# Patient Record
Sex: MALE | Race: WHITE | Employment: OTHER | URBAN - METROPOLITAN AREA
[De-identification: names, ages, dates, MRNs, and addresses within clinical notes are randomized per-mention and may not be internally consistent; named-entity substitution may affect disease eponyms.]

---

## 2017-06-26 ENCOUNTER — HOSPITAL ENCOUNTER (OUTPATIENT)
Dept: OTHER | Age: 72
Discharge: HOME | End: 2017-06-26
Attending: PHYSICIAN ASSISTANT | Admitting: PHYSICIAN ASSISTANT

## 2017-06-26 LAB
POLARIZED LIGHT EXAM: ABNORMAL
RBC, SYNOVIAL FLUID: 4000 CU MM
SYN LYMPH: 1 %
SYN OTHER CELLS: ABNORMAL %
SYN SEGMENTED: 98 %
SYN WBC COUNT: ABNORMAL CU MM (ref 0–200)

## 2017-06-29 LAB
CULTURE: NORMAL
GRAM SMEAR: NORMAL
REPORT STATUS: NORMAL
REQUEST PROBLEM: NORMAL
SPECIMEN: NORMAL

## 2020-08-11 ENCOUNTER — HOSPITAL ENCOUNTER (OUTPATIENT)
Dept: CT IMAGING | Age: 75
Discharge: HOME | End: 2020-08-11
Payer: MEDICARE

## 2020-08-11 LAB
GFR AFRICAN AMERICAN: >60 ML/MIN/1.73M2
GFR NON-AFRICAN AMERICAN: >60 ML/MIN/1.73M2
POC CREATININE: 1.1 MG/DL (ref 0.9–1.3)

## 2020-08-11 PROCEDURE — 2580000003 HC RX 258: Performed by: PSYCHIATRY & NEUROLOGY

## 2020-08-11 PROCEDURE — 70496 CT ANGIOGRAPHY HEAD: CPT

## 2020-08-11 PROCEDURE — 6360000004 HC RX CONTRAST MEDICATION: Performed by: PSYCHIATRY & NEUROLOGY

## 2020-08-11 PROCEDURE — 70498 CT ANGIOGRAPHY NECK: CPT

## 2020-08-11 RX ORDER — SODIUM CHLORIDE 0.9 % (FLUSH) 0.9 %
10 SYRINGE (ML) INJECTION PRN
Status: DISCONTINUED | OUTPATIENT
Start: 2020-08-11 | End: 2020-08-12 | Stop reason: HOSPADM

## 2020-08-11 RX ADMIN — SODIUM CHLORIDE, PRESERVATIVE FREE 10 ML: 5 INJECTION INTRAVENOUS at 12:30

## 2020-08-11 RX ADMIN — IOPAMIDOL 75 ML: 755 INJECTION, SOLUTION INTRAVENOUS at 12:30

## 2020-09-10 ENCOUNTER — HOSPITAL ENCOUNTER (OUTPATIENT)
Dept: LAB | Age: 75
Discharge: HOME | End: 2020-09-10
Payer: MEDICARE

## 2020-09-10 PROCEDURE — C9803 HOPD COVID-19 SPEC COLLECT: HCPCS

## 2020-09-10 PROCEDURE — U0002 COVID-19 LAB TEST NON-CDC: HCPCS

## 2020-09-11 LAB
SARS-COV-2: NOT DETECTED
SOURCE: NORMAL

## 2020-09-17 ENCOUNTER — HOSPITAL ENCOUNTER (OUTPATIENT)
Dept: NON INVASIVE DIAGNOSTICS | Age: 75
Discharge: HOME | End: 2020-09-17
Payer: MEDICARE

## 2020-09-17 ENCOUNTER — HOSPITAL ENCOUNTER (OUTPATIENT)
Age: 75
Discharge: HOME | End: 2020-09-17
Payer: MEDICARE

## 2020-09-17 VITALS
OXYGEN SATURATION: 95 % | HEART RATE: 64 BPM | RESPIRATION RATE: 13 BRPM | SYSTOLIC BLOOD PRESSURE: 109 MMHG | DIASTOLIC BLOOD PRESSURE: 68 MMHG

## 2020-09-17 LAB
ANION GAP SERPL CALCULATED.3IONS-SCNC: 10 MMOL/L (ref 4–16)
CHLORIDE BLD-SCNC: 104 MMOL/L (ref 99–110)
CO2: 26 MMOL/L (ref 21–32)
POTASSIUM SERPL-SCNC: 3.9 MMOL/L (ref 3.5–5.1)
SODIUM BLD-SCNC: 140 MMOL/L (ref 135–145)

## 2020-09-17 PROCEDURE — 93660 TILT TABLE EVALUATION: CPT

## 2020-09-17 PROCEDURE — 7100000001 HC PACU RECOVERY - ADDTL 15 MIN

## 2020-09-17 PROCEDURE — 80051 ELECTROLYTE PANEL: CPT

## 2020-09-17 PROCEDURE — 7100000000 HC PACU RECOVERY - FIRST 15 MIN

## 2020-09-17 PROCEDURE — 36415 COLL VENOUS BLD VENIPUNCTURE: CPT

## 2020-09-17 NOTE — PROGRESS NOTES
HEAD UP TILT TABLE TEST     Name: Joaquina Zavala  Date: 20  : 1945    MRN: [id number]  Physician: No att. providers found     Allergies: Allergies   Allergen Reactions    Asa [Aspirin]     Influenza Vac Typ A&B Surf Ant     Procaine Hcl      \"all reuben, zylecaine, \"    Pcn [Penicillins] Rash        Medications:   Prior to Visit Medications    Not on File         Past Medical History:   Diagnosis Date    Liver disorder     Multiple allergies        No past surgical history on file.     [x] Patient verbalizes understanding of procedure    [x]  Consent obtained  [x]  NPO X13 hours  [x]  IV established with # 22 Site left WRIST  [x]  12 Lead EKG obtained   [x]  Electrolytes Na [x]  K [x]  CL [x]  CO2 [x]   Time:          3845      Date:  2020    Nursing Notes/ Vital Signs/ LOC/ Skin Color      TIME BP HR RR SPO2 COMMENTS   0902 115/70 72 14 96 SUPINE - PRE PROCEDURE   0909 127/82 65 27 100 STANDING - BEGIN PROCEDURE   0910 121/82 71 34 96 STANDING   0912 123/80 64 15 100 STANDING   0914 131/78 68 15 99 STATES HE IS LIGHT HEADED   0916 134/75 65 15 95 STANDING   0918 121/76 64 17 99 DIZZY - LIGHTHEADED - WORSE THAT WHEN TEST STARTED   0920 120/76 72 18 98 FEELS DIZZY BUT TELLS DOCTOR THAT HE FEELS WORSE WHEN WALKING   0922 126/75 72 21 100 TALKING WITHOUT DISTRESS   0924 120/71 71 22 99 R AND L CAROTID MASSAGE   0925 117/71 71 22 98 TALKING NO INCREASED DIZZINESS   0925 120/80 63 11 98 NTG ADMINISTERED SUBLINGUAL   0926 135/78 69 16 100 STANDING - TALKING WITH PA   0927 126/74 70 17 98 FEELS A LITTLE DIZZY   0928 107/69 90 24 98 HAS HEADACHE\" ABOVE EYES INSIDE MY HEAD\"   0929 109/74 76 11 99 STANDING   0930 96/58 81 14 96 TALKING WITH PA   0931 106/55 86 15 100 STANDING - NOT FEELING NORMAL   0932 78/55 83 23 98 \"HEAD FEELS HEAVY\"   0933 82/59 91 24 96 FEELS LIKE HE WANTS TO VOMIT - WRETCHING   0934 93/62 86 25 98 STANDING   0935 97/69 81 23 96 STANDING - END PROCEDURE   0936 102/63 59

## 2020-09-18 NOTE — PROCEDURES
[address], [facility]                                TILT TABLE TEST    PATIENT NAME: Joaquina Elder                 :        1945  MED REC NO:   [id number]                          ROOM:  ACCOUNT NO:   [de-identified]                           ADMIT DATE: 2020  PROVIDER:     ELYSE Flores    DATE OF STUDY:  2020    REASON FOR PROCEDURE:  Dizziness, lightheadedness and history of  syncope. DESCRIPTION OF PROCEDURE:  The patient presents to our noninvasive lab  today for tilt table study in order to further evaluate chronic  dizziness that has been going on since 2020 with few episodes of  syncope as well. Workup to this point has been negative neurologically  and cardiovascularly. Informed consent was obtained. The patient was  placed onto the tilt table and elevated to a 75-degree position. Resting blood pressure at that time was 115/70, heart rate was 72. Blood pressure remained stable throughout the procedure peaking at  134/75, heart rate was 65 at that time. He did say a few times that he  was feeling dizzy and lightheaded, which was his baseline. After 15  minutes, proceeded with carotid massage, right and left were done,  strips were printed. He remained in normal sinus rhythm and blood  pressure remained stable at that time at 117/71. His dizziness did not  worsen with this. His blood pressure remained stable at 120/80 and  heart rate was still normal sinus rhythm at 63 and nitro spray was  given. He did start to feel a little bit dizzy and lightheaded and his  blood pressure did drop incrementally with the low at 78/55. Heart  rate, however, did increase all the way up to 91, remained in normal  sinus rhythm with a few episodes of PVCs. At this time, he did start to  feel like he was going to vomit and his lightheadedness got worse.   He  did dry heave a few times, but did not vomit and did not pass out, and  blood pressure began to increase and come back to normal.  Heart rate  remained elevated. At this time, after 10 about minutes, he was laid  back down into a supine position. Heart rate returned to normal at 59,  remained sinus and blood pressure returned to 102/63. IMPRESSION:  This is a negative stress test for neurocardiogenic syncope  and autonomic dysfunction. I did discuss with the patient as workup  thus far has been negative, he did have a 72-hour Zio patch placed at  the office that did show short episodes of PAF, but nonsignificant. We  could place an event monitor of 30 days. Although as his symptoms are  occurring daily, unsure if this would be able to add much to the  diagnosis. I do not believe that his symptoms are cardiac related. More likely to be vertigo or neurologic in some capacity. The patient  is following up with neurologist at this time. We will see the patient  in followup and go over recommendations.       Patient seen  And procedure performed  Agree with above   ELYSE Alejandra    D: 09/17/2020 10:07:52       T: 09/17/2020 10:55:31     [email or web address]  Job#: [id number]     Doc#: [id number]    CC:

## 2020-10-15 LAB
AVERAGE GLUCOSE: NORMAL
FOLATE: >20
HBA1C MFR BLD: 5.6 %
TSH SERPL DL<=0.05 MIU/L-ACNC: 4.51 UIU/ML
VITAMIN B-12: 487

## 2021-04-27 ENCOUNTER — HOSPITAL ENCOUNTER (EMERGENCY)
Age: 76
Discharge: HOME | End: 2021-04-27
Attending: EMERGENCY MEDICINE
Payer: MEDICARE

## 2021-04-27 ENCOUNTER — APPOINTMENT (OUTPATIENT)
Dept: GENERAL RADIOLOGY | Age: 76
End: 2021-04-27
Payer: MEDICARE

## 2021-04-27 ENCOUNTER — APPOINTMENT (OUTPATIENT)
Dept: CT IMAGING | Age: 76
End: 2021-04-27
Payer: MEDICARE

## 2021-04-27 VITALS
HEART RATE: 74 BPM | DIASTOLIC BLOOD PRESSURE: 73 MMHG | RESPIRATION RATE: 18 BRPM | SYSTOLIC BLOOD PRESSURE: 119 MMHG | TEMPERATURE: 98 F | OXYGEN SATURATION: 100 %

## 2021-04-27 DIAGNOSIS — R55 SYNCOPE, UNSPECIFIED SYNCOPE TYPE: Primary | ICD-10-CM

## 2021-04-27 LAB
ALBUMIN SERPL-MCNC: 3.8 GM/DL (ref 3.4–5)
ALP BLD-CCNC: 126 IU/L (ref 40–129)
ALT SERPL-CCNC: 14 U/L (ref 10–40)
ANION GAP SERPL CALCULATED.3IONS-SCNC: 7 MMOL/L (ref 4–16)
AST SERPL-CCNC: 25 IU/L (ref 15–37)
BACTERIA: NEGATIVE /HPF
BASOPHILS ABSOLUTE: 0 K/CU MM
BASOPHILS RELATIVE PERCENT: 0.5 % (ref 0–1)
BILIRUB SERPL-MCNC: 0.7 MG/DL (ref 0–1)
BILIRUBIN DIRECT: 0.2 MG/DL (ref 0–0.3)
BILIRUBIN URINE: NEGATIVE MG/DL
BILIRUBIN, INDIRECT: 0.5 MG/DL (ref 0–0.7)
BLOOD, URINE: NEGATIVE
BUN BLDV-MCNC: 16 MG/DL (ref 6–23)
CALCIUM SERPL-MCNC: 8.9 MG/DL (ref 8.3–10.6)
CHLORIDE BLD-SCNC: 99 MMOL/L (ref 99–110)
CLARITY: CLEAR
CO2: 30 MMOL/L (ref 21–32)
COLOR: YELLOW
CREAT SERPL-MCNC: 1.2 MG/DL (ref 0.9–1.3)
DIFFERENTIAL TYPE: ABNORMAL
EOSINOPHILS ABSOLUTE: 0.3 K/CU MM
EOSINOPHILS RELATIVE PERCENT: 3.4 % (ref 0–3)
GFR AFRICAN AMERICAN: >60 ML/MIN/1.73M2
GFR NON-AFRICAN AMERICAN: 59 ML/MIN/1.73M2
GLUCOSE BLD-MCNC: 86 MG/DL
GLUCOSE BLD-MCNC: 86 MG/DL (ref 70–99)
GLUCOSE BLD-MCNC: 91 MG/DL (ref 70–99)
GLUCOSE, URINE: NEGATIVE MG/DL
HCT VFR BLD CALC: 37.5 % (ref 42–52)
HEMOGLOBIN: 10.9 GM/DL (ref 13.5–18)
IMMATURE NEUTROPHIL %: 0.3 % (ref 0–0.43)
KETONES, URINE: NEGATIVE MG/DL
LEUKOCYTE ESTERASE, URINE: NEGATIVE
LYMPHOCYTES ABSOLUTE: 1.5 K/CU MM
LYMPHOCYTES RELATIVE PERCENT: 19.2 % (ref 24–44)
MCH RBC QN AUTO: 24.1 PG (ref 27–31)
MCHC RBC AUTO-ENTMCNC: 29.1 % (ref 32–36)
MCV RBC AUTO: 82.8 FL (ref 78–100)
MONOCYTES ABSOLUTE: 0.7 K/CU MM
MONOCYTES RELATIVE PERCENT: 9 % (ref 0–4)
NITRITE URINE, QUANTITATIVE: NEGATIVE
NUCLEATED RBC %: 0 %
PDW BLD-RTO: 17.2 % (ref 11.7–14.9)
PH, URINE: 8 (ref 5–8)
PLATELET # BLD: 198 K/CU MM (ref 140–440)
PMV BLD AUTO: 10.6 FL (ref 7.5–11.1)
POTASSIUM SERPL-SCNC: 4.2 MMOL/L (ref 3.5–5.1)
PRO-BNP: 108.9 PG/ML
PROTEIN UA: NEGATIVE MG/DL
RBC # BLD: 4.53 M/CU MM (ref 4.6–6.2)
RBC URINE: 1 /HPF (ref 0–3)
SEGMENTED NEUTROPHILS ABSOLUTE COUNT: 5.3 K/CU MM
SEGMENTED NEUTROPHILS RELATIVE PERCENT: 67.6 % (ref 36–66)
SODIUM BLD-SCNC: 136 MMOL/L (ref 135–145)
SPECIFIC GRAVITY UA: 1.01 (ref 1–1.03)
TOTAL IMMATURE NEUTOROPHIL: 0.02 K/CU MM
TOTAL NUCLEATED RBC: 0 K/CU MM
TOTAL PROTEIN: 6.9 GM/DL (ref 6.4–8.2)
TRICHOMONAS: NORMAL /HPF
TROPONIN T: <0.01 NG/ML
UROBILINOGEN, URINE: NEGATIVE MG/DL (ref 0.2–1)
WBC # BLD: 7.9 K/CU MM (ref 4–10.5)
WBC UA: <1 /HPF (ref 0–2)

## 2021-04-27 PROCEDURE — 82962 GLUCOSE BLOOD TEST: CPT

## 2021-04-27 PROCEDURE — 85025 COMPLETE CBC W/AUTO DIFF WBC: CPT

## 2021-04-27 PROCEDURE — 84484 ASSAY OF TROPONIN QUANT: CPT

## 2021-04-27 PROCEDURE — 71045 X-RAY EXAM CHEST 1 VIEW: CPT

## 2021-04-27 PROCEDURE — 99285 EMERGENCY DEPT VISIT HI MDM: CPT

## 2021-04-27 PROCEDURE — 93005 ELECTROCARDIOGRAM TRACING: CPT | Performed by: EMERGENCY MEDICINE

## 2021-04-27 PROCEDURE — 70450 CT HEAD/BRAIN W/O DYE: CPT

## 2021-04-27 PROCEDURE — 80053 COMPREHEN METABOLIC PANEL: CPT

## 2021-04-27 PROCEDURE — 81001 URINALYSIS AUTO W/SCOPE: CPT

## 2021-04-27 PROCEDURE — 82248 BILIRUBIN DIRECT: CPT

## 2021-04-27 PROCEDURE — 83880 ASSAY OF NATRIURETIC PEPTIDE: CPT

## 2021-04-27 NOTE — ED TRIAGE NOTES
Pt arrived to ED via EMS. Pt was at [facility] and had five 10-15 sec syncope episodes. Pt had 2 for EMS and has 2 since being in the ED.

## 2021-04-27 NOTE — ED PROVIDER NOTES
[address] ENCOUNTER      Pt Name: Jamel Hoyos  MRN: [id number]  [place] [birth year]  Date of evaluation: 4/27/2021  Provider: Bessie Lefort, MD    CHIEF COMPLAINT       Chief Complaint   Patient presents with    Loss of Consciousness     x5 for provider, x2 for EMS         HISTORY OF PRESENT ILLNESS      Jamel Hoyos is a 67 y.o. male who presents to the emergency department  for   Chief Complaint   Patient presents with    Loss of Consciousness     x5 for provider, x2 for EMS       67 yom with h/o afib and syncope presents after multiple brief syncopal episodes. He was at a senior center earlier today and noted to have had 5, 10-15 second episodes of syncope. He returned to his neurolgoic baseline after each episode. No report of any focal neurologic deficits. No report of focal weakness, paresthesias, speech, vision or hearing changes. He was brought to the ED for evaluation. While in the ED, he has had several similar episodes where he appears to lose consciousness for seconds then returns to his baseline. He endorses feeling some lightheadedness. He denies any chest pain or other focal pain. Denies any changes in  From review of records, he has a history of syncope and has had some extensive cardiac and neruologic work-up including a tilt test in 2/2021 that was unremarkable. In the ED, he is alert and oriented. He is answering questions well. He is moving all extremities spontaneously. Nursing Notes, Triage Notes & Vital Signs were reviewed. REVIEW OF SYSTEMS    (2-9 systems for level 4, 10 or more for level 5)     Review of Systems   Constitutional: Negative for chills and fever. HENT: Negative for congestion, rhinorrhea and sore throat. Eyes: Negative for pain and discharge. Respiratory: Negative for cough and shortness of breath.     Cardiovascular: Negative for chest pain and palpitations. Gastrointestinal: Negative for abdominal pain, nausea and vomiting. Endocrine: Negative for polydipsia and polyuria. Genitourinary: Negative for dysuria and flank pain. Musculoskeletal: Negative for back pain and neck pain. Skin: Negative for pallor and wound. Neurological: Positive for syncope. Negative for dizziness, facial asymmetry, light-headedness and headaches. Psychiatric/Behavioral: Negative for confusion. Except as noted above the remainder of the review of systems was reviewed and negative. PAST MEDICAL HISTORY     Past Medical History:   Diagnosis Date    Liver disorder     Multiple allergies        Prior to Admission medications    Not on File        There is no problem list on file for this patient. SURGICAL HISTORY     History reviewed. No pertinent surgical history. CURRENT MEDICATIONS       There are no discharge medications for this patient. ALLERGIES     Asa [aspirin], Influenza vac typ a&b surf ant, Procaine hcl, and Pcn [penicillins]    FAMILY HISTORY     History reviewed. No pertinent family history.        SOCIAL HISTORY       Social History     Socioeconomic History    Marital status:      Spouse name: None    Number of children: None    Years of education: None    Highest education level: None   Occupational History    None   Social Needs    Financial resource strain: None    Food insecurity     Worry: None     Inability: None    Transportation needs     Medical: None     Non-medical: None   Tobacco Use    Smoking status: Never Smoker    Smokeless tobacco: Never Used   Substance and Sexual Activity    Alcohol use: No    Drug use: No    Sexual activity: None   Lifestyle    Physical activity     Days per week: None     Minutes per session: None    Stress: None   Relationships    Social connections     Talks on phone: None     Gets together: None     Attends Shinto service: None     Active member of club or organization: None     Attends meetings of clubs or organizations: None     Relationship status: None    Intimate partner violence     Fear of current or ex partner: None     Emotionally abused: None     Physically abused: None     Forced sexual activity: None   Other Topics Concern    None   Social History Narrative    None       SCREENINGS    Araceli Coma Scale  Eye Opening: Spontaneous  Best Verbal Response: Oriented  Best Motor Response: Obeys commands  Araceli Coma Scale Score: 15          PHYSICAL EXAM    (up to 7 for level 4, 8 or more for level 5)     ED Triage Vitals   BP Temp Temp src Pulse Resp SpO2 Height Weight   -- -- -- -- -- -- -- --       Physical Exam  Vitals signs reviewed. Constitutional:       Appearance: He is not ill-appearing or toxic-appearing. HENT:      Head: Normocephalic and atraumatic. Nose: Nose normal. No congestion or rhinorrhea. Mouth/Throat:      Mouth: Mucous membranes are moist.      Pharynx: No oropharyngeal exudate or posterior oropharyngeal erythema. Eyes:      General:         Right eye: No discharge. Left eye: No discharge. Extraocular Movements: Extraocular movements intact. Pupils: Pupils are equal, round, and reactive to light. Neck:      Musculoskeletal: Normal range of motion and neck supple. Cardiovascular:      Rate and Rhythm: Normal rate. Heart sounds: No friction rub. No gallop. Pulmonary:      Effort: Pulmonary effort is normal. No respiratory distress. Chest:      Chest wall: No tenderness. Abdominal:      Palpations: Abdomen is soft. Tenderness: There is no abdominal tenderness. There is no guarding. Musculoskeletal: Normal range of motion. General: No swelling or tenderness. Skin:     General: Skin is warm. Capillary Refill: Capillary refill takes less than 2 seconds. Findings: No erythema or rash. Neurological:      General: No focal deficit present.       Mental Status: He is alert and oriented to person, place, and time. Mental status is at baseline. DIAGNOSTIC RESULTS     Labs Reviewed   BASIC METABOLIC PANEL W/ REFLEX TO MG FOR LOW K - Abnormal; Notable for the following components:       Result Value    GFR Non- 59 (*)     All other components within normal limits   CBC WITH AUTO DIFFERENTIAL - Abnormal; Notable for the following components:    RBC 4.53 (*)     Hemoglobin 10.9 (*)     Hematocrit 37.5 (*)     MCH 24.1 (*)     MCHC 29.1 (*)     RDW 17.2 (*)     Segs Relative 67.6 (*)     Lymphocytes % 19.2 (*)     Monocytes % 9.0 (*)     Eosinophils % 3.4 (*)     All other components within normal limits   POCT GLUCOSE - Normal   HEPATIC FUNCTION PANEL   TROPONIN   BRAIN NATRIURETIC PEPTIDE   URINE RT REFLEX TO CULTURE   POCT GLUCOSE          EKG: All EKG's are interpreted by the Emergency Department Physician who either signs or Co-signs this chart in the absence of a cardiologist.       EKG Interpretation    Interpreted by emergency department physician    EKG is interpreted by me. EKG shows sinus rhythm at 81 bpm, there are some PVCs, there is some artifact on EKG making a bit difficult to interpret, Axis appears to be nondeviated, no remarkable ST segment patients or depressions, there are some inverted T waves in lateral leads. There does appear to be a V prime wave in V1 consistent with an incomplete right bundle branch block, MN interval 164, QRS ration 106, QTC of 185. Fine impression, nonspecific EKG. From the records, he has had similar morphology on previous EKGs. Nathaniel Code     RADIOLOGY:     Non-plain film images such as CT, Ultrasound and MRI are read by the radiologist. Plain radiographic images are visualized and preliminarily interpreted by the emergency physician.        Interpretation per the Radiologist below, if available at the time of this note:    XR CHEST PORTABLE   Final Result   Retrocardiac opacity may reflect neurological baseline. Denies any other remarkable symptoms. Presents with over unremarkable vitals. Did obtain labs and chest x-ray. EKG is nonischemic. Labs nonacute at baseline. Head CT scan nonacute. Chest x-ray nonacute. Results are discussed with patient. He is offered admission given that he does have a cluster of these episodes. This time he prefers continued outpatient management. I did discuss disposition with him and his wife. They demonstrate understanding shows appropriate capacity to decline admission. He states he believes his symptoms may be due to \"stress. \"  He reports he is volunteering and that is the source of his stress. Given unremarkable work-up, I do believe he is safe for discharge home. He has been observed for several days in the emergency department without further such episodes. He will follow outpatient with his established physicians. Strict return precautions for worsening concerning symptoms are discussed. He discharged home in stable condition. Amount and/or Complexity of Data Reviewed  Decide to obtain previous medical records or to obtain history from someone other than the patient: yes        -  Patient seen and evaluated in the emergency department. -  Triage and nursing notes reviewed and incorporated. -  Old chart records reviewed and incorporated. -  Work-up included:  See above  -  Results discussed with patient. CONSULTS:  None    PROCEDURES:  None performed unless otherwise noted below     Procedures        FINAL IMPRESSION      1.  Syncope, unspecified syncope type          DISPOSITION/PLAN   DISPOSITION Decision To Discharge 04/27/2021 04:26:23 PM      PATIENT REFERRED TO:  Thaddeus Bryant MD  [address] [place] [address],[address]  [phone]    Schedule an appointment as soon as possible for a visit in 1 day      Your Cardiologist and Your Neurologist  Please follow-up promtply with your neurologist and your cardiologist          DISCHARGE MEDICATIONS:  There are no discharge medications for this patient. ED Provider Disposition Time  DISPOSITION Decision To Discharge 04/27/2021 04:26:23 PM      Appropriate personal protective equipment was worn during the patient's evaluation. These included surgical, eye protection, surgical mask or in 95 respirator and gloves. The patient was also placed in a surgical mask for the prevention of possible spread of respiratory viral illnesses. The Patient was instructed to read the package inserts with any medication that was prescribed. Major potential reactions and medication interactions were discussed. The Patient understands that there are numerous possible adverse reactions not covered. The patient was also instructed to arrange follow-up with his or her primary care provider for review of any pending labwork or incidental findings on any radiology results that were obtained. All efforts were made to discuss any incidental findings that require further monitoring. Controlled Substances Monitoring:     No flowsheet data found.     (Please note that portions of this note were completed with a voice recognition program.  Efforts were made to edit the dictations but occasionally words are mis-transcribed.)    Tate Lorenz MD (electronically signed)  Attending Emergency Physician          Tate Lorenz MD  05/02/21 [phone]

## 2021-04-28 LAB
EKG ATRIAL RATE: 81 BPM
EKG DIAGNOSIS: NORMAL
EKG P AXIS: 48 DEGREES
EKG P-R INTERVAL: 164 MS
EKG Q-T INTERVAL: 418 MS
EKG QRS DURATION: 106 MS
EKG QTC CALCULATION (BAZETT): 485 MS
EKG R AXIS: 6 DEGREES
EKG T AXIS: 54 DEGREES
EKG VENTRICULAR RATE: 81 BPM

## 2021-04-28 PROCEDURE — 93010 ELECTROCARDIOGRAM REPORT: CPT | Performed by: INTERNAL MEDICINE

## 2021-05-02 ASSESSMENT — ENCOUNTER SYMPTOMS
SHORTNESS OF BREATH: 0
NAUSEA: 0
SORE THROAT: 0
BACK PAIN: 0
EYE DISCHARGE: 0
EYE PAIN: 0
VOMITING: 0
COUGH: 0
RHINORRHEA: 0
ABDOMINAL PAIN: 0

## 2021-09-13 DIAGNOSIS — R20.8 DYSESTHESIA: ICD-10-CM

## 2021-09-13 DIAGNOSIS — G60.9 IDIOPATHIC PERIPHERAL NEUROPATHY: ICD-10-CM

## 2021-09-13 DIAGNOSIS — I48.91 ATRIAL FIBRILLATION, UNSPECIFIED TYPE (HCC): ICD-10-CM

## 2021-09-13 PROBLEM — R42 LIGHTHEADEDNESS: Status: ACTIVE | Noted: 2020-02-17

## 2021-09-13 PROBLEM — R27.0 ATAXIA: Status: ACTIVE | Noted: 2020-02-17

## 2021-09-13 RX ORDER — LANSOPRAZOLE 30 MG/1
CAPSULE, DELAYED RELEASE ORAL
COMMUNITY

## 2021-09-13 RX ORDER — LEVOTHYROXINE SODIUM 0.1 MG/1
TABLET ORAL
COMMUNITY

## 2021-09-13 RX ORDER — CARBOXYMETHYLCELLULOSE SODIUM 5 MG/ML
SOLUTION/ DROPS OPHTHALMIC
COMMUNITY

## 2021-09-13 RX ORDER — MECLIZINE HYDROCHLORIDE 25 MG/1
TABLET ORAL
COMMUNITY
End: 2022-02-15

## 2021-09-13 RX ORDER — MONTELUKAST SODIUM 10 MG/1
TABLET ORAL
COMMUNITY

## 2021-09-13 RX ORDER — SUCRALFATE 1 G/1
TABLET ORAL
COMMUNITY

## 2021-09-13 RX ORDER — SPIRONOLACTONE 25 MG/1
TABLET ORAL
COMMUNITY

## 2021-09-13 RX ORDER — PROMETHAZINE HYDROCHLORIDE 6.25 MG/5ML
SYRUP ORAL
COMMUNITY
End: 2022-02-15

## 2021-09-13 RX ORDER — CELECOXIB 200 MG/1
CAPSULE ORAL
COMMUNITY

## 2021-09-13 RX ORDER — TRAMADOL HYDROCHLORIDE 50 MG/1
TABLET ORAL
COMMUNITY
End: 2022-02-15

## 2021-09-13 RX ORDER — PREDNISONE 20 MG/1
TABLET ORAL
COMMUNITY
End: 2022-02-15

## 2021-09-13 RX ORDER — LORAZEPAM 1 MG/1
TABLET ORAL
COMMUNITY

## 2021-09-13 RX ORDER — AMMONIUM LACTATE 12 G/100G
LOTION TOPICAL
COMMUNITY
End: 2022-02-15 | Stop reason: ALTCHOICE

## 2021-09-13 RX ORDER — CICLOPIROX 7.7 MG/G
GEL TOPICAL
COMMUNITY
End: 2022-02-15 | Stop reason: ALTCHOICE

## 2021-09-13 RX ORDER — ALLOPURINOL 100 MG/1
TABLET ORAL
COMMUNITY

## 2021-09-13 RX ORDER — SILDENAFIL 100 MG/1
TABLET, FILM COATED ORAL
COMMUNITY
End: 2022-02-15

## 2021-09-13 RX ORDER — RIFAMPIN 150 MG/1
CAPSULE ORAL
COMMUNITY

## 2021-09-13 RX ORDER — BUMETANIDE 1 MG/1
TABLET ORAL
COMMUNITY

## 2021-09-13 RX ORDER — PEG-3350, SODIUM SULFATE, SODIUM CHLORIDE, POTASSIUM CHLORIDE, SODIUM ASCORBATE AND ASCORBIC ACID 7.5-2.691G
KIT ORAL
COMMUNITY
End: 2022-02-15

## 2021-09-13 RX ORDER — ALBUTEROL SULFATE 90 UG/1
AEROSOL, METERED RESPIRATORY (INHALATION)
COMMUNITY

## 2021-09-13 RX ORDER — MOMETASONE FUROATE 50 UG/1
SPRAY, METERED NASAL
COMMUNITY

## 2021-09-27 ENCOUNTER — OFFICE VISIT (OUTPATIENT)
Dept: NEUROLOGY | Age: 76
End: 2021-09-27
Payer: MEDICARE

## 2021-09-27 VITALS
BODY MASS INDEX: 20.99 KG/M2 | WEIGHT: 155 LBS | HEIGHT: 72 IN | OXYGEN SATURATION: 98 % | HEART RATE: 62 BPM | SYSTOLIC BLOOD PRESSURE: 118 MMHG | DIASTOLIC BLOOD PRESSURE: 78 MMHG

## 2021-09-27 DIAGNOSIS — R42 VERTIGO: Primary | ICD-10-CM

## 2021-09-27 DIAGNOSIS — G60.9 IDIOPATHIC PERIPHERAL NEUROPATHY: ICD-10-CM

## 2021-09-27 DIAGNOSIS — R42 LIGHTHEADEDNESS: ICD-10-CM

## 2021-09-27 PROCEDURE — 99214 OFFICE O/P EST MOD 30 MIN: CPT | Performed by: PSYCHIATRY & NEUROLOGY

## 2021-09-27 NOTE — PROGRESS NOTES
9/27/21    Nedra Richard  1945    Chief Complaint   Patient presents with    Follow-up     Vertigo no improvement since last visit even with Epley Maneuver       History of Present Illness  Pato Gallo is a 67 y.o. male presenting today for follow-up of:  Shun is seen in follow-up for dizziness. At a previous visit he had a strong sensation of lightheadedness. He did have fainting during autonomic testing. The testing was relatively inconclusive, though. We did trial midodrine 5 mg 3 times daily empirically and he states this may have \"berserk like\" and that was too strong and made him feel worse. He has since stopped the medication. He states that he does take Ativan 2 mg twice daily, both doses towards the end of the day, which helps his sleep. It helps him calm down enough to fall asleep but it does not necessarily help the dizziness. He has tried doses during the waking hours and this has not helped the dizziness. He was told by a friend to try the Epley maneuver and he states that he has been doing this and feels that it has been somewhat helpful. He denies ever trying vestibular therapy. Current Outpatient Medications   Medication Sig Dispense Refill    allopurinol (ZYLOPRIM) 100 MG tablet allopurinol 100 mg tablet   take one tablet twice a day      ammonium lactate (LAC-HYDRIN) 12 % lotion ammonium lactate 12 % lotion      apixaban (ELIQUIS) 5 MG TABS tablet Eliquis 5 mg tablet      bumetanide (BUMEX) 1 MG tablet bumetanide 1 mg tablet   Take 1 tablet every day by oral route.  celecoxib (CELEBREX) 200 MG capsule Celebrex 200 mg capsule   1 tab daily      lansoprazole (PREVACID) 30 MG delayed release capsule       levothyroxine (SYNTHROID) 100 MCG tablet Synthroid 100 mcg tablet   TAKE (1) TABLET BY MOUTH DAILY      LORazepam (ATIVAN) 1 MG tablet Ativan 1 mg tablet   Take 1 tablet 4 times a day by oral route as needed.       montelukast (SINGULAIR) 10 MG tablet Singulair 10 mg tablet   Tablet     1 daily      rifAMPin (RIFADIN) 150 MG capsule rifampin 150 mg capsule   Capsule     1 daily      rifaximin (XIFAXAN) 200 MG tablet       spironolactone (ALDACTONE) 25 MG tablet 2 daily      sucralfate (CARAFATE) 1 GM tablet Carafate 1 gram tablet   Take 1 tablet 4 times a day by oral route as needed.  traMADol (ULTRAM) 50 MG tablet tramadol 50 mg tablet      albuterol sulfate HFA (PROAIR HFA) 108 (90 Base) MCG/ACT inhaler ProAir HFA 90 mcg/actuation aerosol inhaler      carboxymethylcellulose (REFRESH PLUS) 0.5 % SOLN ophthalmic solution Lubricating Plus 0.5 % eye drops in a dropperette (Patient not taking: Reported on 9/27/2021)      Ciclopirox (LOPROX) 0.77 % gel ciclopirox 0.77 % topical gel (Patient not taking: Reported on 9/27/2021)      hydrocortisone 2.5 % cream hydrocortisone 2.5 % topical cream (Patient not taking: Reported on 9/27/2021)      meclizine (ANTIVERT) 25 MG tablet meclizine 25 mg tablet (Patient not taking: Reported on 9/27/2021)      metoprolol tartrate (LOPRESSOR) 25 MG tablet metoprolol tartrate 25 mg tablet   Take 1 tablet twice a day by oral route.  mometasone (NASONEX) 50 MCG/ACT nasal spray mometasone 50 mcg/actuation nasal spray      PEG-KCl-NaCl-NaSulf-Na Asc-C (MOVIPREP) 100 g solution MoviPrep 100 gram-7.5 gram-2.691 gram oral powder packet (Patient not taking: Reported on 9/27/2021)      predniSONE (DELTASONE) 20 MG tablet prednisone 20 mg tablet (Patient not taking: Reported on 9/27/2021)      promethazine (PHENERGAN) 6.25 MG/5ML syrup  (Patient not taking: Reported on 9/27/2021)      sildenafil (VIAGRA) 100 MG tablet Viagra 100 mg tablet (Patient not taking: Reported on 9/27/2021)       No current facility-administered medications for this visit.        Physical Exam:    Mental Status   Orientation: oriented to person, oriented to place, oriented to problem and oriented to time    Mood/affectappropriate mood and appropriate affect   Memory/Other: recent memory intact, remote memory intact, fund of knowledge intact, attention span normal and concentration normal  Language  Language: (normal) language, no dysarthria, (normal) articulation and no dysphasia/aphasia  Cranial Nerves   Eyes: pupils normal size and reactive to light and visual fields appear full   CN III, IV, VI : extraocular muscle strength normal, normal pursuit, no nystagmus and no ptosis, Dizziness with gaze to the left   Facial Motor: normal facial motor  Motor/Coordination Exam   Power: motor strength appears intact throughout, no arm drift and normal tone    Gait and Stance   Gait/Posture: station normal, casual gait normal and ambulates independently         /78 (Site: Right Upper Arm, Position: Sitting, Cuff Size: Medium Adult)   Pulse 62   Ht 6' (1.829 m)   Wt 155 lb (70.3 kg)   SpO2 98%   BMI 21.02 kg/m²     Assessment and Plan     Diagnosis Orders   1. Vertigo  PT vestibular rehab   2. Idiopathic peripheral neuropathy     3. Lightheadedness       Shun has been having treatment refractory dizziness. The characteristics of his dizziness appear to evolve with time. Previous description of dizziness was profoundly that of lightheadedness. Empiric treatment with midodrine was not successful. He has been using Epley maneuver at home with some success. I will set him up for vestibular therapy as this could prove useful. He does not want to trial any other medication at this time. Return in about 4 months (around 1/27/2022) for me or CAIN.     Romayne Holiday, DO

## 2022-02-15 ENCOUNTER — OFFICE VISIT (OUTPATIENT)
Dept: NEUROLOGY | Age: 77
End: 2022-02-15
Payer: MEDICARE

## 2022-02-15 VITALS
DIASTOLIC BLOOD PRESSURE: 80 MMHG | HEART RATE: 51 BPM | OXYGEN SATURATION: 98 % | BODY MASS INDEX: 20.99 KG/M2 | WEIGHT: 155 LBS | SYSTOLIC BLOOD PRESSURE: 120 MMHG | HEIGHT: 72 IN

## 2022-02-15 DIAGNOSIS — R55 SYNCOPE, UNSPECIFIED SYNCOPE TYPE: ICD-10-CM

## 2022-02-15 DIAGNOSIS — R42 VERTIGO: Primary | ICD-10-CM

## 2022-02-15 DIAGNOSIS — F41.9 ANXIETY: ICD-10-CM

## 2022-02-15 PROCEDURE — 1036F TOBACCO NON-USER: CPT | Performed by: PSYCHIATRY & NEUROLOGY

## 2022-02-15 PROCEDURE — 99214 OFFICE O/P EST MOD 30 MIN: CPT | Performed by: PSYCHIATRY & NEUROLOGY

## 2022-02-15 PROCEDURE — 4040F PNEUMOC VAC/ADMIN/RCVD: CPT | Performed by: PSYCHIATRY & NEUROLOGY

## 2022-02-15 PROCEDURE — G8484 FLU IMMUNIZE NO ADMIN: HCPCS | Performed by: PSYCHIATRY & NEUROLOGY

## 2022-02-15 PROCEDURE — G8427 DOCREV CUR MEDS BY ELIG CLIN: HCPCS | Performed by: PSYCHIATRY & NEUROLOGY

## 2022-02-15 PROCEDURE — 1123F ACP DISCUSS/DSCN MKR DOCD: CPT | Performed by: PSYCHIATRY & NEUROLOGY

## 2022-02-15 PROCEDURE — G8420 CALC BMI NORM PARAMETERS: HCPCS | Performed by: PSYCHIATRY & NEUROLOGY

## 2022-02-27 NOTE — PROGRESS NOTES
2/15/22    Bola Jake  [id number]    Chief Complaint   Patient presents with    Follow-up     Vertigo has been the same since last visit. History of Present Illness  Henna Booker is a 67 y.o. male presenting today for follow-up of:  Shun is seen in follow-up for dizziness. 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 He had several seizure-like spells at his doctor's office. He states that he does not remember driving to his doctor's office but in the waiting room he fainted and had some shaking associated with it. He states that he has tried vestibular therapy which was not overly helpful for the dizziness. Previously, it was thought that he may be had not tried vestibular therapy but after I described to him he states that he has tried this. He does do the Epley maneuver at home which is sometimes helpful. Other than Ativan which she takes for sleep he has not been on any anxiety medicines such as an SNRI or SSRI. Current Outpatient Medications   Medication Sig Dispense Refill    sertraline (ZOLOFT) 50 MG tablet Take 1 tablet by mouth daily 30 tablet 5    albuterol sulfate HFA (PROAIR HFA) 108 (90 Base) MCG/ACT inhaler ProAir HFA 90 mcg/actuation aerosol inhaler      allopurinol (ZYLOPRIM) 100 MG tablet allopurinol 100 mg tablet   take one tablet twice a day      bumetanide (BUMEX) 1 MG tablet bumetanide 1 mg tablet   Take 1 tablet every day by oral route.  carboxymethylcellulose (REFRESH PLUS) 0.5 % SOLN ophthalmic solution Lubricating Plus 0.5 % eye drops in a dropperette      celecoxib (CELEBREX) 200 MG capsule Celebrex 200 mg capsule   1 tab daily      lansoprazole (PREVACID) 30 MG delayed release capsule       levothyroxine (SYNTHROID) 100 MCG tablet Synthroid 100 mcg tablet   TAKE (1) TABLET BY MOUTH DAILY      LORazepam (ATIVAN) 1 MG tablet Ativan 1 mg tablet   Take 1 tablet 4 times a day by oral route as needed.       metoprolol tartrate (LOPRESSOR) 25 MG tablet metoprolol tartrate 25 mg tablet   Take 1 tablet twice a day by oral route.  mometasone (NASONEX) 50 MCG/ACT nasal spray mometasone 50 mcg/actuation nasal spray      montelukast (SINGULAIR) 10 MG tablet Singulair 10 mg tablet   Tablet     1 daily      rifAMPin (RIFADIN) 150 MG capsule rifampin 150 mg capsule   Capsule     1 daily      rifaximin (XIFAXAN) 200 MG tablet       spironolactone (ALDACTONE) 25 MG tablet 2 daily      sucralfate (CARAFATE) 1 GM tablet Carafate 1 gram tablet   Take 1 tablet 4 times a day by oral route as needed. No current facility-administered medications for this visit. Physical Exam:    Mental Status   Orientation: oriented to person, oriented to place, oriented to problem and oriented to time    Mood/affectappropriate mood and appropriate affect   Memory/Other: recent memory intact, remote memory intact, fund of knowledge intact, attention span normal and concentration normal  Language  Language: (normal) language, no dysarthria, (normal) articulation and no dysphasia/aphasia  Cranial Nerves   Eyes: pupils normal size and reactive to light and visual fields appear full   CN III, IV, VI : extraocular muscle strength normal, normal pursuit, no nystagmus and no ptosis, Dizziness with gaze to the left   Facial Motor: normal facial motor  Motor/Coordination Exam   Power: motor strength appears intact throughout, no arm drift and normal tone    Gait and Stance   Gait/Posture: station normal, casual gait normal and ambulates independently         /80 (Site: Left Upper Arm, Position: Sitting, Cuff Size: Medium Adult)   Pulse 51   Ht 6' (1.829 m)   Wt 155 lb (70.3 kg)   SpO2 98%   BMI 21.02 kg/m²     Assessment and Plan     Diagnosis Orders   1. Vertigo     2. Syncope, unspecified syncope type  EEG   3. Anxiety  sertraline (ZOLOFT) 50 MG tablet     Shun has been having treatment refractory dizziness. The characteristics of his dizziness appear to evolve with time. Previous description of dizziness was profoundly that of lightheadedness. Empiric treatment with midodrine was not successful. He has been using Epley maneuver at home with some success though he clarifies that formal vestibular therapy was not helpful in the past..  Meclizine and scopolamine patches were also not overly helpful. We will empirically trial sertraline 50 mg daily to see if the \"dizziness\" may be a manifestation of underlying untreated anxiety. We will check an EEG due to recent descriptions of syncope associated with amnesia. Return in about 6 months (around 8/15/2022) for Follow-up me or CAIN.     Mayur Shore, DO

## 2023-05-08 PROBLEM — K21.9 GASTROESOPHAGEAL REFLUX DISEASE WITHOUT ESOPHAGITIS: Status: ACTIVE | Noted: 2023-05-08

## 2023-05-11 ENCOUNTER — HOSPITAL ENCOUNTER (OUTPATIENT)
Dept: GENERAL RADIOLOGY | Age: 78
Discharge: HOME | End: 2023-05-11
Payer: MEDICARE

## 2023-05-11 DIAGNOSIS — K21.9 GASTROESOPHAGEAL REFLUX DISEASE WITHOUT ESOPHAGITIS: ICD-10-CM

## 2023-05-11 PROCEDURE — 74240 X-RAY XM UPR GI TRC 1CNTRST: CPT

## 2023-11-08 ENCOUNTER — HOSPITAL ENCOUNTER (OUTPATIENT)
Dept: ULTRASOUND IMAGING | Age: 78
Discharge: HOME | End: 2023-11-08
Attending: SPECIALIST
Payer: MEDICARE

## 2023-11-08 DIAGNOSIS — N50.819 PAIN IN TESTICLE, UNSPECIFIED LATERALITY: ICD-10-CM

## 2023-11-08 PROCEDURE — 76870 US EXAM SCROTUM: CPT

## 2023-11-08 PROCEDURE — 93975 VASCULAR STUDY: CPT

## 2024-03-18 ENCOUNTER — TRANSCRIBE ORDERS (OUTPATIENT)
Dept: ADMINISTRATIVE | Age: 79
End: 2024-03-18

## 2024-03-18 DIAGNOSIS — R31.0 GROSS HEMATURIA: Primary | ICD-10-CM

## 2025-08-11 ENCOUNTER — HOSPITAL ENCOUNTER (EMERGENCY)
Age: 80
Discharge: HOME | End: 2025-08-11
Attending: STUDENT IN AN ORGANIZED HEALTH CARE EDUCATION/TRAINING PROGRAM
Payer: MEDICARE

## 2025-08-11 VITALS
OXYGEN SATURATION: 97 % | TEMPERATURE: 97.6 F | SYSTOLIC BLOOD PRESSURE: 158 MMHG | HEART RATE: 69 BPM | RESPIRATION RATE: 16 BRPM | DIASTOLIC BLOOD PRESSURE: 88 MMHG

## 2025-08-11 DIAGNOSIS — Z51.89 VISIT FOR WOUND CHECK: Primary | ICD-10-CM

## 2025-08-11 DIAGNOSIS — L76.22 POSTOPERATIVE HEMORRHAGE OF SKIN FOLLOWING NON-DERMATOLOGIC PROCEDURE: ICD-10-CM

## 2025-08-11 PROCEDURE — 99282 EMERGENCY DEPT VISIT SF MDM: CPT

## 2025-08-11 ASSESSMENT — PAIN SCALES - GENERAL: PAINLEVEL_OUTOF10: 0

## 2025-08-11 ASSESSMENT — LIFESTYLE VARIABLES
HOW OFTEN DO YOU HAVE A DRINK CONTAINING ALCOHOL: NEVER
HOW MANY STANDARD DRINKS CONTAINING ALCOHOL DO YOU HAVE ON A TYPICAL DAY: PATIENT DOES NOT DRINK

## 2025-08-11 ASSESSMENT — PAIN - FUNCTIONAL ASSESSMENT: PAIN_FUNCTIONAL_ASSESSMENT: 0-10
